# Patient Record
Sex: MALE
[De-identification: names, ages, dates, MRNs, and addresses within clinical notes are randomized per-mention and may not be internally consistent; named-entity substitution may affect disease eponyms.]

---

## 2023-06-09 ENCOUNTER — APPOINTMENT (OUTPATIENT)
Dept: UROLOGY | Facility: CLINIC | Age: 33
End: 2023-06-09
Payer: COMMERCIAL

## 2023-06-09 VITALS
WEIGHT: 161 LBS | HEART RATE: 100 BPM | HEIGHT: 73 IN | RESPIRATION RATE: 16 BRPM | SYSTOLIC BLOOD PRESSURE: 125 MMHG | OXYGEN SATURATION: 97 % | DIASTOLIC BLOOD PRESSURE: 80 MMHG | TEMPERATURE: 98 F | BODY MASS INDEX: 21.34 KG/M2

## 2023-06-09 DIAGNOSIS — Z11.3 ENCOUNTER FOR SCREENING FOR INFECTIONS WITH A PREDOMINANTLY SEXUAL MODE OF TRANSMISSION: ICD-10-CM

## 2023-06-09 DIAGNOSIS — R35.0 FREQUENCY OF MICTURITION: ICD-10-CM

## 2023-06-09 PROBLEM — Z00.00 ENCOUNTER FOR PREVENTIVE HEALTH EXAMINATION: Status: ACTIVE | Noted: 2023-06-09

## 2023-06-09 PROCEDURE — 51798 US URINE CAPACITY MEASURE: CPT

## 2023-06-09 PROCEDURE — 99204 OFFICE O/P NEW MOD 45 MIN: CPT | Mod: 25

## 2023-06-09 NOTE — HISTORY OF PRESENT ILLNESS
[FreeTextEntry1] : Language: English\par Date of First visit: 06/09/2023 \par Accompanied by: self\par Contact info: \par Referring Provider/PCP: Dr. Colon\par Fax: \par \par \par CC/ Problem List:\par Urinary Frequency\par screening for STI\par ===============================================================================\par FIRST VISIT / Summary:\par Very pleasant 32 year old M here for frequency since tuesday. Slight burning or itching with urination. Does not always feel empty. He is straining to void. No hematuria, no leakage. Maybe some clear discharge. Sexually active, wife only, never had STI's in the past. Symptoms improved last night\par \par -------------------------------------------------------------------------------------------\par INTERVAL VISITS:\par \par ===============================================================================\par \par PMH: none\par Meds: none\par All: nkda\par FHx: No  malignancies \par SocHx: nonsmoker, etoh 1/wk, \par \par PSH: none\par \par \par ROS: Review of Systems is as per HPI unless otherwise denoted below\par \par \par ===============================================================================\par DATA: \par \par LABS (SELECTED):---------------------------------------------------------------------------------------------------\par 06/09/2023 Urinalysis today normal no LE,Nitrite,Protein, blood\par \par RADS:-------------------------------------------------------------------------------------------------------------------\par \par \par PATHOLOGY/CYTOLOGY:-------------------------------------------------------------------------------------------\par \par \par VOIDING STUDIES: ----------------------------------------------------------------------------------------------------\par 06/09/2023 PVR 0 normal\par \par STONE STUDIES: (Analysis/LLSA)----------------------------------------------------------------------------------\par \par \par PROCEDURES: -----------------------------------------------------------------------------------------------\par \par \par \par \par ===============================================================================\par \par PHYSICAL EXAM:\par \par GEN: AAOx3, NAD\par \par PSYCH: Appropriate Behavior, Affect Congruent\par \par Lungs: No labored breathing\par \par GAIT: Gait normal, Stability good\par \par ABD: no suprapubic or CVAT\par \par \par  FOCUSED: ----------------------------------------------------------------------------------------------------------------\par \par \par =======================================================================================\par DISCUSSION: \par =======================================================================================\par ASSESSMENT and PLAN\par \par 1. Frequency\par - UA and cx\par - poct normal\par - abx only if cx positive\par - RTC as needed\par \par 2. STI screening\par - patient declines aside from above tests\par \par =======================================================================================\par \par Thank you for allowing me to assist in the care of your patient. Should you have any questions please do not hesitate to reach out to me.\par \par \par Alejandro Morris MD                                                            \par [facility]\par [facility]\par \par [place] Office:\par [address], [address]   \[place] [address]    \par T: [phone]    \par F: [phone]    \par \par [place] Office:\par [address], 1st floor\[place] [address]\par T: [phone]\par F: [phone]

## 2023-06-11 LAB
APPEARANCE: CLEAR
BACTERIA UR CULT: NORMAL
BACTERIA: NEGATIVE /HPF
BILIRUBIN URINE: NEGATIVE
BLOOD URINE: NEGATIVE
C TRACH RRNA SPEC QL NAA+PROBE: NOT DETECTED
CAST: 1 /LPF
COLOR: YELLOW
EPITHELIAL CELLS: 0 /HPF
GLUCOSE QUALITATIVE U: NEGATIVE MG/DL
KETONES URINE: NEGATIVE MG/DL
LEUKOCYTE ESTERASE URINE: NEGATIVE
MICROSCOPIC-UA: NORMAL
N GONORRHOEA RRNA SPEC QL NAA+PROBE: NOT DETECTED
NITRITE URINE: NEGATIVE
PH URINE: 6
PROTEIN URINE: NEGATIVE MG/DL
RED BLOOD CELLS URINE: 0 /HPF
SOURCE AMPLIFICATION: NORMAL
SOURCE AMPLIFICATION: NORMAL
SPECIFIC GRAVITY URINE: 1.01
T VAGINALIS RRNA SPEC QL NAA+PROBE: NOT DETECTED
UROBILINOGEN URINE: 0.2 MG/DL
WHITE BLOOD CELLS URINE: 0 /HPF

## 2023-06-14 ENCOUNTER — TRANSCRIPTION ENCOUNTER (OUTPATIENT)
Age: 33
End: 2023-06-14

## 2023-12-15 ENCOUNTER — APPOINTMENT (OUTPATIENT)
Dept: UROLOGY | Facility: CLINIC | Age: 33
End: 2023-12-15
Payer: COMMERCIAL

## 2023-12-15 VITALS
RESPIRATION RATE: 16 BRPM | WEIGHT: 161 LBS | TEMPERATURE: 98 F | OXYGEN SATURATION: 98 % | DIASTOLIC BLOOD PRESSURE: 81 MMHG | HEART RATE: 91 BPM | SYSTOLIC BLOOD PRESSURE: 123 MMHG | HEIGHT: 73 IN | BODY MASS INDEX: 21.34 KG/M2

## 2023-12-15 DIAGNOSIS — N41.1 CHRONIC PROSTATITIS: ICD-10-CM

## 2023-12-15 PROCEDURE — 99214 OFFICE O/P EST MOD 30 MIN: CPT

## 2023-12-15 NOTE — HISTORY OF PRESENT ILLNESS
[FreeTextEntry1] :  ARLEN DEGROOT Jul 8 1990   Language: English Date of First visit: 06/09/2023 Accompanied by: self Contact info:  Referring Provider/PCP: Dr. Colon Fax:   CC/ Problem List: Urinary Frequency screening for STI  =============================================================================== FIRST VISIT / Summary:  Very pleasant 32 year old M here for frequency since tuesday. Slight burning or itching with urination. Does not always feel empty. He is straining to void. No hematuria, no leakage. Maybe some clear discharge. Sexually active, wife only, never had STI's in the past. Symptoms improved last night  ------------------------------------------------------------------------------------------- INTERVAL VISITS: The patient's medications and allergies were reviewed and edited below. Dated 12/15/2023  =============================================================================== PMH: none Meds: none All: nkda FHx: No  malignancies SocHx: nonsmoker, etoh 1/wk,   PSH: none  ROS: Review of Systems is as per HPI unless otherwise denoted below  =============================================================================== DATA:  LABS (SELECTED):--------------------------------------------------------------------------------------------------- 06/09/2023 Urinalysis today normal no LE,Nitrite,Protein, blood  RADS:-------------------------------------------------------------------------------------------------------------------   PATHOLOGY/CYTOLOGY:-------------------------------------------------------------------------------------------   VOIDING STUDIES: ---------------------------------------------------------------------------------------------------- 06/09/2023 PVR 0 normal  STONE STUDIES: (Analysis/LLSA)----------------------------------------------------------------------------------   PROCEDURES: -----------------------------------------------------------------------------------------------    ===============================================================================  PHYSICAL EXAM:   FOCUSED: ----------------------------------------------------------------------------------------------------------------    ======================================================================================= DISCUSSION: I explained to the patient that male pelvic pain is one of the most difficult things in Urology to diagnose and treat. I explained to the patient that in my mind the differential diagnosis includes: -- musculoskeletal issues (SI joint pain, piriformis syndrome, etc) -- Inflammatory and infectious causes (GC, CT, UTI, prostatitis) -- Functional issues: Pelvic floor dysfunction, primary BNO, DO -- Anatomic issues: tumors, strictures  For men with the diagnosis of prostatitis, there are four NIH categories: Category I: Acute bacterial prostatitis Category II: Chronic bacterial prostatitis Category III: Chronic pelvic pain syndrome: Genitourinary pain without pathogenic bacteria 	IIIA: Inflammatory CPPS: Excessive WBC in prostatic secretions 	IIIB: Non-inflammatory CPPS: No significant WBC in prostatic fluid Category IV: Asymptomatic Inflammatory prostatitis   Given this differential diagnosis we discussed his options: 1. Checking for infections: urine culture / STI testing 2. Avoid dietary triggers (smoking, alcohol / drug use, caffeine, acidic and spicy foods) 3. Anti-inflammatory medications (ibuprofen 400 TID for 5 days, cetirizine 10mg daily, Phenazopyridine for 2-3 days) 4. Tamsulosin: We discussed how this works.  The patient understands that this medication may cause dizziness, retrograde ejaculation or nasal congestion among other complications. I recommended that the patient take this medication at night. If the side effects become too bothersome, the medication can be discontinued. 5. Urodynamics: I explained how urodynamics are performed: a small catheter is placed into the rectum and the bladder and the patient's bladder will be filled. The patient will be asked to perform maneuvers during this and once full, will be asked to void. X-rays will be taken throughout and this will help us establish the cause of the patient's symptoms. 6. Cystoscopy to r/o anatomic issues 7. One month of antibiotics: we discussed the rationale behind this 8. Pelvic Floor Physical therapy ======================================================================================= ASSESSMENT and PLAN  1. Frequency - UA and cx negative prior - he is interested in having pelvic floor PT and dietary changes - RTC as needed  2. STI screening - patient states had STI testing at outside facility  ======================================================================================= The total time personally spent preparing for this visit (reviewing test results, obtaining external history) and during the visit (ordering tests/medications, spent face to face with the patient / family and counseling them on the above), as well as after the visit (on clinical documentation and coordination with other care providers) was approximately 32 minutes.   Thank you for allowing me to assist in the care of your patient. Should you have any questions please do not hesitate to reach out to me.  Alejandro Morris MD                                                             [facility]  [place] Office: [address], [address]    [place] [address]     T: [phone]     F: [phone]      [place] Office: [address] [place] [address], 1st [place] [address] T: [phone] F: [phone]